# Patient Record
Sex: MALE | URBAN - METROPOLITAN AREA
[De-identification: names, ages, dates, MRNs, and addresses within clinical notes are randomized per-mention and may not be internally consistent; named-entity substitution may affect disease eponyms.]

---

## 2024-08-18 ENCOUNTER — LAB OUTSIDE AN ENCOUNTER (OUTPATIENT)
Dept: URBAN - METROPOLITAN AREA CLINIC 19 | Facility: CLINIC | Age: 74
End: 2024-08-18

## 2024-08-20 ENCOUNTER — OFFICE VISIT (OUTPATIENT)
Dept: URBAN - METROPOLITAN AREA CLINIC 19 | Facility: CLINIC | Age: 74
End: 2024-08-20
Payer: MEDICARE

## 2024-08-20 ENCOUNTER — DASHBOARD ENCOUNTERS (OUTPATIENT)
Age: 74
End: 2024-08-20

## 2024-08-20 VITALS
HEART RATE: 49 BPM | TEMPERATURE: 98.6 F | DIASTOLIC BLOOD PRESSURE: 80 MMHG | SYSTOLIC BLOOD PRESSURE: 110 MMHG | HEIGHT: 68 IN | BODY MASS INDEX: 31.52 KG/M2 | OXYGEN SATURATION: 100 % | WEIGHT: 208 LBS

## 2024-08-20 DIAGNOSIS — Z86.010 PERSONAL HISTORY OF COLONIC POLYPS: ICD-10-CM

## 2024-08-20 PROCEDURE — 99203 OFFICE O/P NEW LOW 30 MIN: CPT

## 2024-08-20 RX ORDER — HYDROCHLOROTHIAZIDE 12.5 MG/1
1 CAPSULE IN THE MORNING CAPSULE, GELATIN COATED ORAL ONCE A DAY
Status: ACTIVE | COMMUNITY

## 2024-08-20 RX ORDER — LISINOPRIL 40 MG/1
1 TABLET TABLET ORAL ONCE A DAY
Status: ACTIVE | COMMUNITY

## 2024-08-20 RX ORDER — RELUGOLIX 120 MG/1
1 TABLET TABLET, FILM COATED ORAL ONCE A DAY
Status: ACTIVE | COMMUNITY

## 2024-08-20 RX ORDER — LOVASTATIN 20 MG/1
1 TABLET WITH THE EVENING MEAL TABLET ORAL ONCE A DAY
Status: ACTIVE | COMMUNITY

## 2024-08-20 NOTE — HPI-TODAY'S VISIT:
74-year-old male with PMH of HTN, HDL, prostate cancer s/p prostatectomy in 2009 presents for a colonoscopy.  The patient has a personal history of colon polyps.  There is no family history of colon polyps or colon cancer.  Patient denies change in bowel habits, appetite, and weight.  Patient denies rectal bleeding.  Last colonoscopy: 5 yrs ago - it was reportedly normal but prior revealed polyps  Denies heart, lungs and kidney issues. Sees a vascular surgeon at [facility] yearly for ?aorta/carotid artery aneurysm Does not see a cardiologist Denies heartburn and difficulty swallowing.

## 2024-09-09 ENCOUNTER — OFFICE VISIT (OUTPATIENT)
Dept: URBAN - METROPOLITAN AREA CLINIC 19 | Facility: CLINIC | Age: 74
End: 2024-09-09

## 2024-09-18 ENCOUNTER — CLAIMS CREATED FROM THE CLAIM WINDOW (OUTPATIENT)
Dept: URBAN - METROPOLITAN AREA CLINIC 4 | Facility: CLINIC | Age: 74
End: 2024-09-18
Payer: MEDICARE

## 2024-09-18 ENCOUNTER — OFFICE VISIT (OUTPATIENT)
Dept: URBAN - METROPOLITAN AREA SURGERY CENTER 31 | Facility: SURGERY CENTER | Age: 74
End: 2024-09-18
Payer: MEDICARE

## 2024-09-18 DIAGNOSIS — D12.3 ADENOMATOUS POLYP OF TRANSVERSE COLON: ICD-10-CM

## 2024-09-18 DIAGNOSIS — Z86.010 ADENOMAS PERSONAL HISTORY OF COLONIC POLYPS: ICD-10-CM

## 2024-09-18 DIAGNOSIS — K51.40 INFLAMMATORY POLYPS OF COLON WITHOUT COMPLICATIONS: ICD-10-CM

## 2024-09-18 DIAGNOSIS — D12.3 BENIGN NEOPLASM OF TRANSVERSE COLON: ICD-10-CM

## 2024-09-18 DIAGNOSIS — K63.5 POLYP OF COLON: ICD-10-CM

## 2024-09-18 DIAGNOSIS — K62.1 ANAL AND RECTAL POLYP: ICD-10-CM

## 2024-09-18 DIAGNOSIS — K63.89 HYPERPLASTIC POLYP OF INTESTINE: ICD-10-CM

## 2024-09-18 DIAGNOSIS — D12.3 ADENOMA OF TRANSVERSE COLON: ICD-10-CM

## 2024-09-18 DIAGNOSIS — Z12.11 COLON CANCER SCREENING: ICD-10-CM

## 2024-09-18 PROCEDURE — 0529F INTRVL 3/>YR PTS CLNSCP DOCD: CPT | Performed by: INTERNAL MEDICINE

## 2024-09-18 PROCEDURE — 45385 COLONOSCOPY W/LESION REMOVAL: CPT | Performed by: INTERNAL MEDICINE

## 2024-09-18 PROCEDURE — 88305 TISSUE EXAM BY PATHOLOGIST: CPT | Performed by: PATHOLOGY

## 2024-09-18 PROCEDURE — 00811 ANES LWR INTST NDSC NOS: CPT | Performed by: NURSE ANESTHETIST, CERTIFIED REGISTERED

## 2024-09-18 RX ORDER — HYDROCHLOROTHIAZIDE 12.5 MG/1
1 CAPSULE IN THE MORNING CAPSULE, GELATIN COATED ORAL ONCE A DAY
Status: ACTIVE | COMMUNITY

## 2024-09-18 RX ORDER — LISINOPRIL 40 MG/1
1 TABLET TABLET ORAL ONCE A DAY
Status: ACTIVE | COMMUNITY

## 2024-09-18 RX ORDER — LOVASTATIN 20 MG/1
1 TABLET WITH THE EVENING MEAL TABLET ORAL ONCE A DAY
Status: ACTIVE | COMMUNITY

## 2024-09-18 RX ORDER — RELUGOLIX 120 MG/1
1 TABLET TABLET, FILM COATED ORAL ONCE A DAY
Status: ACTIVE | COMMUNITY

## 2024-10-14 ENCOUNTER — OFFICE VISIT (OUTPATIENT)
Dept: URBAN - METROPOLITAN AREA CLINIC 19 | Facility: CLINIC | Age: 74
End: 2024-10-14
Payer: MEDICARE

## 2024-10-14 VITALS
WEIGHT: 205.6 LBS | SYSTOLIC BLOOD PRESSURE: 146 MMHG | BODY MASS INDEX: 31.16 KG/M2 | HEIGHT: 68 IN | OXYGEN SATURATION: 99 % | DIASTOLIC BLOOD PRESSURE: 86 MMHG | HEART RATE: 72 BPM | TEMPERATURE: 98.1 F

## 2024-10-14 DIAGNOSIS — Z71.2 ENCOUNTER TO DISCUSS COLONOSCOPY RESULTS: ICD-10-CM

## 2024-10-14 DIAGNOSIS — K63.5: ICD-10-CM

## 2024-10-14 PROCEDURE — 99212 OFFICE O/P EST SF 10 MIN: CPT

## 2024-10-14 RX ORDER — LOVASTATIN 20 MG/1
1 TABLET WITH THE EVENING MEAL TABLET ORAL ONCE A DAY
Status: ACTIVE | COMMUNITY

## 2024-10-14 RX ORDER — HYDROCHLOROTHIAZIDE 12.5 MG/1
1 CAPSULE IN THE MORNING CAPSULE, GELATIN COATED ORAL ONCE A DAY
Status: ACTIVE | COMMUNITY

## 2024-10-14 RX ORDER — LISINOPRIL 40 MG/1
1 TABLET TABLET ORAL ONCE A DAY
Status: ACTIVE | COMMUNITY

## 2024-10-14 RX ORDER — RELUGOLIX 120 MG/1
1 TABLET TABLET, FILM COATED ORAL ONCE A DAY
Status: ACTIVE | COMMUNITY

## 2024-10-14 NOTE — HPI-TODAY'S VISIT:
Mr. Bliss is a 74-year-old male with PMH of HDL, HTN, prostate cancer s/p prostatectomy in 2009 presents today for follow-up after colonoscopy.  9/18/2024 colonoscopy with Dr. Huerta: 4 polyps removed, diverticulosis, internal hemorrhoids.  Given a 3-year follow-up (2027).  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  Negative for dysplasia or malignancy.   He has a daily normal BM. Denies melena and hematochezia. No heartburn, dysphagia, nausea or vomiting.